# Patient Record
Sex: MALE | Race: WHITE | HISPANIC OR LATINO | Employment: UNEMPLOYED | URBAN - METROPOLITAN AREA
[De-identification: names, ages, dates, MRNs, and addresses within clinical notes are randomized per-mention and may not be internally consistent; named-entity substitution may affect disease eponyms.]

---

## 2023-04-27 ENCOUNTER — APPOINTMENT (OUTPATIENT)
Dept: LAB | Facility: LAB | Age: 68
End: 2023-04-27
Payer: MEDICAID

## 2023-04-27 LAB
ALANINE AMINOTRANSFERASE (SGPT) (U/L) IN SER/PLAS: 11 U/L (ref 10–52)
ALBUMIN (G/DL) IN SER/PLAS: 4.5 G/DL (ref 3.4–5)
ALKALINE PHOSPHATASE (U/L) IN SER/PLAS: 78 U/L (ref 33–136)
ANION GAP IN SER/PLAS: 14 MMOL/L (ref 10–20)
ASPARTATE AMINOTRANSFERASE (SGOT) (U/L) IN SER/PLAS: 13 U/L (ref 9–39)
BILIRUBIN TOTAL (MG/DL) IN SER/PLAS: 0.6 MG/DL (ref 0–1.2)
CALCIUM (MG/DL) IN SER/PLAS: 9.7 MG/DL (ref 8.6–10.6)
CARBON DIOXIDE, TOTAL (MMOL/L) IN SER/PLAS: 26 MMOL/L (ref 21–32)
CHLORIDE (MMOL/L) IN SER/PLAS: 106 MMOL/L (ref 98–107)
CREATININE (MG/DL) IN SER/PLAS: 1.74 MG/DL (ref 0.5–1.3)
ERYTHROCYTE DISTRIBUTION WIDTH (RATIO) BY AUTOMATED COUNT: 17 % (ref 11.5–14.5)
ERYTHROCYTE MEAN CORPUSCULAR HEMOGLOBIN CONCENTRATION (G/DL) BY AUTOMATED: 31.4 G/DL (ref 32–36)
ERYTHROCYTE MEAN CORPUSCULAR VOLUME (FL) BY AUTOMATED COUNT: 89 FL (ref 80–100)
ERYTHROCYTES (10*6/UL) IN BLOOD BY AUTOMATED COUNT: 4.71 X10E12/L (ref 4.5–5.9)
GFR MALE: 42 ML/MIN/1.73M2
GLUCOSE (MG/DL) IN SER/PLAS: 94 MG/DL (ref 74–99)
HEMATOCRIT (%) IN BLOOD BY AUTOMATED COUNT: 41.7 % (ref 41–52)
HEMOGLOBIN (G/DL) IN BLOOD: 13.1 G/DL (ref 13.5–17.5)
LEUKOCYTES (10*3/UL) IN BLOOD BY AUTOMATED COUNT: 6.9 X10E9/L (ref 4.4–11.3)
NRBC (PER 100 WBCS) BY AUTOMATED COUNT: 0 /100 WBC (ref 0–0)
PLATELETS (10*3/UL) IN BLOOD AUTOMATED COUNT: 256 X10E9/L (ref 150–450)
POTASSIUM (MMOL/L) IN SER/PLAS: 4.3 MMOL/L (ref 3.5–5.3)
PROTEIN TOTAL: 7.7 G/DL (ref 6.4–8.2)
SODIUM (MMOL/L) IN SER/PLAS: 142 MMOL/L (ref 136–145)
UREA NITROGEN (MG/DL) IN SER/PLAS: 42 MG/DL (ref 6–23)

## 2024-01-05 ENCOUNTER — CLINICAL SUPPORT (OUTPATIENT)
Dept: AUDIOLOGY | Facility: CLINIC | Age: 69
End: 2024-01-05
Payer: MEDICARE

## 2024-01-05 DIAGNOSIS — H90.3 SENSORINEURAL HEARING LOSS (SNHL) OF BOTH EARS: Primary | ICD-10-CM

## 2024-01-05 PROCEDURE — 92557 COMPREHENSIVE HEARING TEST: CPT | Performed by: AUDIOLOGIST

## 2024-01-05 PROCEDURE — 92550 TYMPANOMETRY & REFLEX THRESH: CPT | Performed by: AUDIOLOGIST

## 2024-01-05 NOTE — PROGRESS NOTES
"  ADULT AUDIOMETRIC EVALUATION    Name:  Kodi Padilla  :  1955  Age:  68 y.o.  Date of Evaluation:  2024    HISTORY:  Reason for visit: Mr. Padilla is seen today for an evaluation of hearing. Patient was unaccompanied.    Patient reports he is here for an annual evaluation of his hearing. He reports he now has [employer] insurance and has been in contact with them regarding hearing aid providers in the area. He reports having an upcoming appointment with an office.    EVALUATION:    See Audiogram and Immittance results under \"Media\".    RESULTS:     Otoscopic Evaluation:     RIGHT  Clear canal with tympanic membrane visualized    LEFT  Clear canal with tympanic membrane visualized    Immittance:   Immittance Measures: 226 Hz          Right Ear: Type A: Normal middle ear function         Left Ear:  Type A: Normal middle ear function    Reflexes and Reflex Decay:    Ipsilateral Reflexes (500-4000 Hz):          Probe/Stimulus Right Ear: present at 500-2000 Hz, did not test 4000 Hz       Probe/Stimulus Left Ear: present at 500-2000 Hz, did not test 4000 Hz    Otoacoustic Emissions [DP(OAEs)]: Did not test    Audiometry:  Test Technique: Standard Audiometry under TDH headphones.    Reliability: Good     Pure Tone Audiometry:    Right: Normal hearing sensitivity at 125-250 Hz falling to mild sloping to moderate sensorineural hearing loss 500-8000 Hz   Left: Mild to moderately-severe sensorineural hearing loss 125-8000 Hz       Speech Audiometry (via recorded, 25-words unless noted; M=masked):       Right Ear: Speech Reception Threshold (SRT) was obtained at 35 dBHL  Word Recognition Scores were Excellent (92%) in quiet when words were presented at 75(M) dBHL, using the NU-6 2A word list.  Left Ear: Speech Reception Threshold (SRT) was obtained at 30 dBHL  Word Recognition Scores were Excellent (92%) in quiet when words were presented at 70 dBHL, using the NU-6 3A word list.    IMPRESSIONS:  Today's test " results suggest normal middle ear function and mild sloping to moderately-severe sensorineural hearing loss in the right ear and normal sloping to moderate sensorineural hearing loss in the left ear. Word understanding is excellent, bilaterally. Compared to previous testing dated 1/3/23, largely stable thresholds.    PATIENT EDUCATION:   Kodi Padilla was counseled with regard to the findings.       PLAN:  Follow up with PCP as directed.  Retest hearing annually; sooner if concerns or changes arise.,  Consideration of hearing aid evaluation to assess need for hearing amplification. Call supplementary insurance to inquire about hearing aid benefits and in-network providers. Your insurance may have a benefit for hearing aids through a preferred provider network.        Laura Cole, MELANIE, CCC-A  Clinical Audiologist    Time: [id number]    Degree of   Hearing Sensitivity dB Range   Within Normal Limits (WNL) 0 - 20   Slight 25   Mild 26 - 40   Moderate 41 - 55   Moderately-Severe 56 - 70   Severe 71 - 90   Profound 91 +     KEY  TM Tympanic Membrane   WNL Within Normal Limits   HA Hearing Aid   SNHL Sensorineural Hearing Loss   CHL Conductive Hearing Loss   NIHL Noise-Induced Hearing Loss   ECV Ear Canal Volume

## 2025-01-17 NOTE — PROGRESS NOTES
Kodi Padilla is a 69 y.o. male with past medical history of HTN, arthritis, and GERD who presents today for abdominal pain.     EGD [facility] 2016. Tortuous esophagus. Two very small gastric ulcers. Gastritis. No H. Pylori.    Colonoscopy 2012 normal. Repeat 10 years.    He reports a *** history of ***. Associated with ***. Worse with ***. He has tried *** with *** improvement.     Denies nausea, vomiting, heartburn, early satiety, and bloating. There has been no change in bowels. He has *** bowel movements per day. No rectal bleeding, hematochezia, or melena. No unintentional weight loss.     He does not take NSAIDS regularly.     No prior EGD or colonoscopy.    Social history: -    Family history: Denies family history of colon cancer or other GI disorders or malignancy.     Past Medical History:   Diagnosis Date    Essential (primary) hypertension 06/19/2013    Hypertension    Personal history of other diseases of the digestive system 06/29/2015    History of esophageal reflux    Personal history of other diseases of the musculoskeletal system and connective tissue     History of arthritis    Personal history of other diseases of the nervous system and sense organs 10/17/2013    History of tension headache    Personal history of other specified conditions 12/30/2014    History of headache    Personal history of other specified conditions 03/31/2015    History of heartburn    Personal history of other specified conditions 04/06/2016    History of headache     Past Surgical History:   Procedure Laterality Date    COLONOSCOPY  04/14/2016    Colonoscopy (Fiberoptic)    ESOPHAGOGASTRODUODENOSCOPY  04/14/2016    Diagnostic Esophagogastroduodenoscopy    EYE SURGERY  04/14/2016    Eye Surgery     No current outpatient medications on file.     No current facility-administered medications for this visit.       Review of Systems  Review of Systems negative except as noted in HPI.    Objective     There were no vitals  taken for this visit.     Physical Exam  Constitutional:  No acute distress. Normal appearance. Not ill-appearing.  HENT:  Head normocephalic and atraumatic. Conjunctivae normal.  Cardiovascular:  Normal rate. Regular rhythm.  Pulmonary:  Pulmonary effort normal. No respiratory distress. Breath sounds clear.  Abdominal:  Abdomen is flat and soft. There is no distension. No tenderness or guarding.  Skin: Dry.  Neurological:  Alert and oriented.  Psychiatric:  Mood and affect normal.    Assessment/Plan     69 y.o. male with history of *** who presents today for clinic visit for *** history of ***.    Recommendations  1.  2. Follow up    Electronically signed by: Ceci Sanon CNP on 1/17/2025 at 10:35 AM

## 2025-01-27 ENCOUNTER — APPOINTMENT (OUTPATIENT)
Dept: GASTROENTEROLOGY | Facility: HOSPITAL | Age: 70
End: 2025-01-27
Payer: MEDICARE

## 2025-02-19 ENCOUNTER — APPOINTMENT (OUTPATIENT)
Dept: GASTROENTEROLOGY | Facility: HOSPITAL | Age: 70
End: 2025-02-19
Payer: MEDICARE

## 2025-05-16 ENCOUNTER — APPOINTMENT (OUTPATIENT)
Dept: ORTHOPEDIC SURGERY | Facility: CLINIC | Age: 70
End: 2025-05-16
Payer: MEDICARE

## 2025-08-20 ENCOUNTER — APPOINTMENT (OUTPATIENT)
Dept: OTOLARYNGOLOGY | Facility: CLINIC | Age: 70
End: 2025-08-20
Payer: MEDICARE

## 2025-10-01 ENCOUNTER — APPOINTMENT (OUTPATIENT)
Dept: NEUROLOGY | Facility: HOSPITAL | Age: 70
End: 2025-10-01
Payer: MEDICARE

## 2025-10-02 ENCOUNTER — APPOINTMENT (OUTPATIENT)
Dept: DERMATOLOGY | Facility: CLINIC | Age: 70
End: 2025-10-02
Payer: MEDICARE